# Patient Record
Sex: FEMALE | Race: WHITE | NOT HISPANIC OR LATINO | Employment: FULL TIME | URBAN - METROPOLITAN AREA
[De-identification: names, ages, dates, MRNs, and addresses within clinical notes are randomized per-mention and may not be internally consistent; named-entity substitution may affect disease eponyms.]

---

## 2017-10-12 ENCOUNTER — OFFICE VISIT (OUTPATIENT)
Dept: OBSTETRICS AND GYNECOLOGY | Facility: CLINIC | Age: 27
End: 2017-10-12
Payer: COMMERCIAL

## 2017-10-12 VITALS
SYSTOLIC BLOOD PRESSURE: 98 MMHG | HEIGHT: 62 IN | BODY MASS INDEX: 23.96 KG/M2 | WEIGHT: 130.19 LBS | DIASTOLIC BLOOD PRESSURE: 68 MMHG

## 2017-10-12 DIAGNOSIS — Z30.9 ENCOUNTER FOR CONTRACEPTIVE MANAGEMENT, UNSPECIFIED TYPE: ICD-10-CM

## 2017-10-12 DIAGNOSIS — Z01.419 WELL WOMAN EXAM: Primary | ICD-10-CM

## 2017-10-12 DIAGNOSIS — Z80.41 FAMILY HISTORY OF OVARIAN CANCER: ICD-10-CM

## 2017-10-12 DIAGNOSIS — Z80.3 FAMILY HISTORY OF BREAST CANCER IN FIRST DEGREE RELATIVE: ICD-10-CM

## 2017-10-12 PROCEDURE — 99385 PREV VISIT NEW AGE 18-39: CPT | Mod: S$GLB,,, | Performed by: STUDENT IN AN ORGANIZED HEALTH CARE EDUCATION/TRAINING PROGRAM

## 2017-10-12 PROCEDURE — 99999 PR PBB SHADOW E&M-NEW PATIENT-LVL II: CPT | Mod: PBBFAC,,, | Performed by: STUDENT IN AN ORGANIZED HEALTH CARE EDUCATION/TRAINING PROGRAM

## 2017-10-12 NOTE — PROGRESS NOTES
Chief Complaint: Well Woman Exam     HPI:      Erica Luna is a 27 y.o.  who presents today for well woman exam.  LMP: Patient's last menstrual period was 2017 (approximate).  Patient is tearful today and reports she has a lot going on.  She had an abnormal pap smear in 2017 and a colposcopy that she reports was graded as a 3 out of 5.  She underwent cryotherapy in [place] in 2017 and missed her follow up because she lost her insurance.  She also reports that she had a Kyleena IUD placed in 2017 and that since then she has noticed migraine like symptoms one the first day before her cycle but she does not have the headache that she used to get.  She reports a history of migraines growing up and that the symptoms are similar except that she does not have head pain.  She has been using a menstrual diary and reports the symptoms have occurred over the last few months and since she had the IUD placed.  Previously she had a maximiliano IUD and denies any issues or concerns.  She also reports that her mother was diagnosed with a breast cancer recurrence and has undergone a double mastectomy over the summer.  She has a family history of her maternal grandmother being diagnosed with breast, ovary, and cervical cancer.  Her mom was diagnosed with breast cancer at 56 yo and underwent chemoradiation.  Recurrence was diagnosed at 65 and she has had double mastectomy.  Patient and her mother have both undergone BRCA testing and are both negative.  Patient reports mother is recovering well.  She denies any other complaints or issues today.  She denies any pelvic pain, dyspareunia, cramping, or abnormal bleeding.  Ms. Luna is currently sexually active with a single male partner. She is currently using IUD for contraception. She declines STD screening today.  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  Denies SI/HI.      Previous Pap: See HPI    GYN Hx:  /4-5 days.  Denies any STIs.   "Gardasil:  Received in .   OB History      Para Term  AB Living    0 0 0 0 0 0    SAB TAB Ectopic Multiple Live Births    0 0 0 0 0        Past Medical History:   Diagnosis Date    Abnormal Pap smear of cervix     2017     Past Surgical History:   Procedure Laterality Date    tonsil      T&A at 10 yo     Social History     Social History    Marital status: Single     Spouse name: N/A    Number of children: N/A    Years of education: N/A     Occupational History    Not on file.     Social History Main Topics    Smoking status: Never Smoker    Smokeless tobacco: Never Used    Alcohol use Yes      Comment: social     Drug use: No    Sexual activity: Yes     Partners: Male     Birth control/ protection: IUD     Other Topics Concern    Not on file     Social History Narrative    No narrative on file     Family History   Problem Relation Age of Onset    Breast cancer Maternal Grandmother     Ovarian cancer Maternal Grandmother     Breast cancer Mother     Colon cancer Neg Hx        ROS:     GENERAL: Denies unintentional weight gain or weight loss. Feeling well overall.   SKIN: Denies rash or lesions.   HEENT: Denies headaches, or vision changes.   CARDIOVASCULAR: Denies palpitations or chest pain.   RESPIRATORY: Denies shortness of breath or dyspnea on exertion.  BREASTS: Denies pain, lumps, or nipple discharge.   ABDOMEN: Denies abdominal pain, constipation, diarrhea, nausea, vomiting, change in appetite.  URINARY: Denies frequency, dysuria, hematuria.  NEUROLOGIC: Denies syncope or weakness.   PSYCHIATRIC: Denies depression, anxiety or mood swings.    Physical Exam:      PHYSICAL EXAM:  BP 98/68   Ht 5' 2" (1.575 m)   Wt 59.1 kg (130 lb 2.9 oz)   LMP 2017 (Approximate)   BMI 23.81 kg/m²   Body mass index is 23.81 kg/m².     APPEARANCE: Well nourished, well developed, in no acute distress.  PSYCH: Appropriate mood and affect.  SKIN: No acne or hirsutism.  NECK: Neck symmetric " without masses or thyromegaly.  NODES: No inguinal, axillary, or supraclavicular lymph node enlargement.  CHEST: Normal respiratory effort.    CARDIOVASCULAR:  Regular rate and rhythm.  LUNGS:  Clear to auscultation bilaterally.  BREASTS: Symmetrical, no skin changes or visible lesions.  No palpable masses or nipple discharge bilaterally.  ABDOMEN: Soft.  No tenderness or masses.    PELVIC: Normal external genitalia without lesions.  Normal hair distribution.  Adequate perineal body, normal urethral meatus.  Vagina moist and well rugated without lesions or discharge.  Cervix pink, without lesions, discharge or tenderness.  IUD strings visualized without difficulty.  No significant cystocele or rectocele.  Bimanual exam shows uterus to be normal size, regular, mobile and nontender.  Adnexa without masses or tenderness.    EXTREMITIES: No edema.  No tenderness to palpation.    Labs:  None    Assessment/Plan:     27 y.o. yo G0 here for well woman exam with history of abnormal pap smear     Well woman exam    Encounter for contraceptive management, unspecified type    Family history of breast cancer in first degree relative    Family history of ovarian cancer          Counselin.  Annual exam performed today without difficulty.  Patient was counseled today on current ASCCP pap guidelines, the recommendation for yearly pelvic exams, healthy diet and exercise routines, breast self awareness.  Pap smear not collected today.  Records requested and will re discuss further screening once these obtained.  All questions answered.    2.  Contraception:  Patient does not desire IUD removal at this time.  Will closely monitor symptoms as patient also notes she is undergoing a lot of stress and this may be contributing.  Will follow up in 3 months.  Will also discuss Neurology referral should migraine symptoms persist.  .  3.  STD testing:  Declines.  4.  Family History:  Patient reports BRCA negative - will request records  and continue to monitor.    5.  Follow up with PCP for other health maintenance.  6.  RTC in 3 months or sooner if needed.    Use of the "[employer] Patient Portal discussed and encouraged during today's visit.

## 2017-10-12 NOTE — PROGRESS NOTES
Pt has Kyleena IUD and it's causing her to have loss of feeling in the left hand, left side of face, left eyes she sees white spots, and migraines.

## 2017-11-03 ENCOUNTER — TELEPHONE (OUTPATIENT)
Dept: OBSTETRICS AND GYNECOLOGY | Facility: CLINIC | Age: 27
End: 2017-11-03

## 2020-07-26 ENCOUNTER — TRANSCRIPTION ENCOUNTER (OUTPATIENT)
Age: 30
End: 2020-07-26